# Patient Record
Sex: FEMALE | Race: WHITE | NOT HISPANIC OR LATINO | URBAN - METROPOLITAN AREA
[De-identification: names, ages, dates, MRNs, and addresses within clinical notes are randomized per-mention and may not be internally consistent; named-entity substitution may affect disease eponyms.]

---

## 2017-02-15 ENCOUNTER — EMERGENCY (EMERGENCY)
Facility: HOSPITAL | Age: 72
LOS: 1 days | Discharge: HOME | End: 2017-02-15
Attending: EMERGENCY MEDICINE | Admitting: EMERGENCY MEDICINE
Payer: MEDICARE

## 2017-02-15 VITALS
SYSTOLIC BLOOD PRESSURE: 164 MMHG | HEART RATE: 100 BPM | WEIGHT: 126.1 LBS | TEMPERATURE: 98 F | RESPIRATION RATE: 18 BRPM | DIASTOLIC BLOOD PRESSURE: 92 MMHG | OXYGEN SATURATION: 97 %

## 2017-02-15 DIAGNOSIS — Z23 ENCOUNTER FOR IMMUNIZATION: ICD-10-CM

## 2017-02-15 DIAGNOSIS — S81.852A OPEN BITE, LEFT LOWER LEG, INITIAL ENCOUNTER: ICD-10-CM

## 2017-02-15 DIAGNOSIS — S81.812A LACERATION WITHOUT FOREIGN BODY, LEFT LOWER LEG, INITIAL ENCOUNTER: ICD-10-CM

## 2017-02-15 PROCEDURE — 99283 EMERGENCY DEPT VISIT LOW MDM: CPT | Mod: 25

## 2017-02-15 PROCEDURE — 90715 TDAP VACCINE 7 YRS/> IM: CPT

## 2017-02-15 PROCEDURE — 99283 EMERGENCY DEPT VISIT LOW MDM: CPT

## 2017-02-15 PROCEDURE — 90471 IMMUNIZATION ADMIN: CPT

## 2017-02-15 RX ORDER — TETANUS TOXOID, REDUCED DIPHTHERIA TOXOID AND ACELLULAR PERTUSSIS VACCINE, ADSORBED 5; 2.5; 8; 8; 2.5 [IU]/.5ML; [IU]/.5ML; UG/.5ML; UG/.5ML; UG/.5ML
0.5 SUSPENSION INTRAMUSCULAR ONCE
Qty: 0 | Refills: 0 | Status: COMPLETED | OUTPATIENT
Start: 2017-02-15 | End: 2017-02-15

## 2017-02-15 RX ADMIN — Medication 1 TABLET(S): at 23:42

## 2017-02-15 RX ADMIN — TETANUS TOXOID, REDUCED DIPHTHERIA TOXOID AND ACELLULAR PERTUSSIS VACCINE, ADSORBED 0.5 MILLILITER(S): 5; 2.5; 8; 8; 2.5 SUSPENSION INTRAMUSCULAR at 23:42

## 2017-02-15 NOTE — ED ADULT NURSE NOTE - OBJECTIVE STATEMENT
[place] A&O3, presents to ed for c.o dog bite to L shin. pt reports being bitten by neighbors dog, neighbor at bedside. pt takes asa 81 daily. denies any other injuries. leg wrapped with gauze, active bleeding. unknown last tetanus. awaiting md jimenez. vss

## 2017-02-16 NOTE — ED PROVIDER NOTE - MEDICAL DECISION MAKING DETAILS
complex laceration secondary to dog bite, loosely approximated with suture however mostly left open given high risk for infection. will heal by secondary intention. with prophylax with abx. PMD follow up this week.

## 2017-02-16 NOTE — ED PROVIDER NOTE - OBJECTIVE STATEMENT
72 yo with dog bite to L lower extremity, animal belonged to neighbor who states that dog is vaccinated.

## 2017-02-17 ENCOUNTER — EMERGENCY (EMERGENCY)
Facility: HOSPITAL | Age: 72
LOS: 1 days | Discharge: HOME | End: 2017-02-17
Attending: EMERGENCY MEDICINE | Admitting: EMERGENCY MEDICINE
Payer: MEDICARE

## 2017-02-17 VITALS
SYSTOLIC BLOOD PRESSURE: 126 MMHG | RESPIRATION RATE: 18 BRPM | HEART RATE: 76 BPM | OXYGEN SATURATION: 100 % | DIASTOLIC BLOOD PRESSURE: 77 MMHG | TEMPERATURE: 98 F | WEIGHT: 207.01 LBS

## 2017-02-17 DIAGNOSIS — S81.812D LACERATION WITHOUT FOREIGN BODY, LEFT LOWER LEG, SUBSEQUENT ENCOUNTER: ICD-10-CM

## 2017-02-17 DIAGNOSIS — Z79.2 LONG TERM (CURRENT) USE OF ANTIBIOTICS: ICD-10-CM
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
## 2017-02-17 PROCEDURE — 99282 EMERGENCY DEPT VISIT SF MDM: CPT

## 2017-02-17 NOTE — ED PROVIDER NOTE - OBJECTIVE STATEMENT
pt to ed with left lower leg pain and laceration from dog bite pt has sutures and area left open for drainage from dog bite no red no streaks no DC mild bleeding no cough no new injury no other complaints pt on ABs and will FU in 10 days needs a would redressed

## 2017-02-17 NOTE — ED ADULT NURSE NOTE - OBJECTIVE STATEMENT
Patient returning after being seen "Wed" for dog bite by a "queta Sr".  Patient denies pain but states after removing dressing placed by provider began to rebleed.  Bleeding now absent, wound seen to be re-opened from "temporary stitches."  Provider to evaluate.

## 2017-02-17 NOTE — ED ADULT TRIAGE NOTE - CHIEF COMPLAINT QUOTE
c/o bleeding to left lower leg from new skin tear. seen and treated for dog bite to left lower leg; 2 stitches placed on wednesday.  Not on thinners. Bleeding controlled. Denies fever, chills, dizziness, n/v/d.

## 2017-09-15 ENCOUNTER — APPOINTMENT (OUTPATIENT)
Dept: OPHTHALMOLOGY | Facility: CLINIC | Age: 72
End: 2017-09-15
Payer: MEDICARE

## 2017-09-15 DIAGNOSIS — H25.013 CORTICAL AGE-RELATED CATARACT, BILATERAL: ICD-10-CM

## 2017-09-15 PROBLEM — Z00.00 ENCOUNTER FOR PREVENTIVE HEALTH EXAMINATION: Status: ACTIVE | Noted: 2017-09-15

## 2017-09-15 PROCEDURE — 92014 COMPRE OPH EXAM EST PT 1/>: CPT

## 2017-09-15 PROCEDURE — 92015 DETERMINE REFRACTIVE STATE: CPT

## 2017-10-03 PROBLEM — H25.013 CORTICAL SENILE CATARACT OF BOTH EYES: Status: ACTIVE | Noted: 2017-10-03

## 2018-10-26 NOTE — ED ADULT NURSE NOTE - CAS TRG GEN SKIN CONDITION
Given two months ago from a phone call for acute injury moving furniture.  Is she still needing it?  May need evaluation   Warm

## 2021-12-16 ENCOUNTER — EMERGENCY (EMERGENCY)
Facility: HOSPITAL | Age: 76
LOS: 1 days | Discharge: HOME | End: 2021-12-16
Attending: EMERGENCY MEDICINE | Admitting: EMERGENCY MEDICINE
Payer: MEDICARE

## 2021-12-16 VITALS
HEART RATE: 108 BPM | HEIGHT: 70 IN | TEMPERATURE: 98 F | SYSTOLIC BLOOD PRESSURE: 167 MMHG | WEIGHT: 132.06 LBS | DIASTOLIC BLOOD PRESSURE: 93 MMHG | RESPIRATION RATE: 18 BRPM | OXYGEN SATURATION: 98 %

## 2021-12-16 DIAGNOSIS — S01.81XA LACERATION WITHOUT FOREIGN BODY OF OTHER PART OF HEAD, INITIAL ENCOUNTER: ICD-10-CM
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
## 2021-12-16 DIAGNOSIS — S60.222A CONTUSION OF LEFT HAND, INITIAL ENCOUNTER: ICD-10-CM
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
## 2021-12-16 DIAGNOSIS — Z20.822 CONTACT WITH AND (SUSPECTED) EXPOSURE TO COVID-19: ICD-10-CM
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
## 2021-12-16 LAB
ALBUMIN SERPL ELPH-MCNC: 4.7 G/DL — SIGNIFICANT CHANGE UP (ref 3.3–5)
ALP SERPL-CCNC: 72 U/L — SIGNIFICANT CHANGE UP (ref 40–120)
ALT FLD-CCNC: 26 U/L — SIGNIFICANT CHANGE UP (ref 10–45)
ANION GAP SERPL CALC-SCNC: 10 MMOL/L — SIGNIFICANT CHANGE UP (ref 5–17)
APTT BLD: 26.2 SEC — LOW (ref 27.5–35.5)
AST SERPL-CCNC: 30 U/L — SIGNIFICANT CHANGE UP (ref 10–40)
BASOPHILS # BLD AUTO: 0.03 K/UL — SIGNIFICANT CHANGE UP (ref 0–0.2)
BASOPHILS NFR BLD AUTO: 0.4 % — SIGNIFICANT CHANGE UP (ref 0–2)
BILIRUB SERPL-MCNC: 0.4 MG/DL — SIGNIFICANT CHANGE UP (ref 0.2–1.2)
BUN SERPL-MCNC: 19 MG/DL — SIGNIFICANT CHANGE UP (ref 7–23)
CALCIUM SERPL-MCNC: 9.1 MG/DL — SIGNIFICANT CHANGE UP (ref 8.4–10.5)
CHLORIDE SERPL-SCNC: 104 MMOL/L — SIGNIFICANT CHANGE UP (ref 96–108)
CO2 SERPL-SCNC: 27 MMOL/L — SIGNIFICANT CHANGE UP (ref 22–31)
CREAT SERPL-MCNC: 0.87 MG/DL — SIGNIFICANT CHANGE UP (ref 0.5–1.3)
EOSINOPHIL # BLD AUTO: 0.01 K/UL — SIGNIFICANT CHANGE UP (ref 0–0.5)
EOSINOPHIL NFR BLD AUTO: 0.1 % — SIGNIFICANT CHANGE UP (ref 0–6)
GLUCOSE SERPL-MCNC: 103 MG/DL — HIGH (ref 70–99)
HCT VFR BLD CALC: 35.5 % — SIGNIFICANT CHANGE UP (ref 34.5–45)
HGB BLD-MCNC: 11.8 G/DL — SIGNIFICANT CHANGE UP (ref 11.5–15.5)
IMM GRANULOCYTES NFR BLD AUTO: 0.2 % — SIGNIFICANT CHANGE UP (ref 0–1.5)
INR BLD: 1.05 — SIGNIFICANT CHANGE UP (ref 0.88–1.16)
LYMPHOCYTES # BLD AUTO: 0.92 K/UL — LOW (ref 1–3.3)
LYMPHOCYTES # BLD AUTO: 11.4 % — LOW (ref 13–44)
MCHC RBC-ENTMCNC: 33.2 GM/DL — SIGNIFICANT CHANGE UP (ref 32–36)
MCHC RBC-ENTMCNC: 34.1 PG — HIGH (ref 27–34)
MCV RBC AUTO: 102.6 FL — HIGH (ref 80–100)
MONOCYTES # BLD AUTO: 0.76 K/UL — SIGNIFICANT CHANGE UP (ref 0–0.9)
MONOCYTES NFR BLD AUTO: 9.4 % — SIGNIFICANT CHANGE UP (ref 2–14)
NEUTROPHILS # BLD AUTO: 6.34 K/UL — SIGNIFICANT CHANGE UP (ref 1.8–7.4)
NEUTROPHILS NFR BLD AUTO: 78.5 % — HIGH (ref 43–77)
NRBC # BLD: 0 /100 WBCS — SIGNIFICANT CHANGE UP (ref 0–0)
PLATELET # BLD AUTO: 238 K/UL — SIGNIFICANT CHANGE UP (ref 150–400)
POTASSIUM SERPL-MCNC: 4 MMOL/L — SIGNIFICANT CHANGE UP (ref 3.5–5.3)
POTASSIUM SERPL-SCNC: 4 MMOL/L — SIGNIFICANT CHANGE UP (ref 3.5–5.3)
PROT SERPL-MCNC: 7.2 G/DL — SIGNIFICANT CHANGE UP (ref 6–8.3)
PROTHROM AB SERPL-ACNC: 12.6 SEC — SIGNIFICANT CHANGE UP (ref 10.6–13.6)
RBC # BLD: 3.46 M/UL — LOW (ref 3.8–5.2)
RBC # FLD: 13.3 % — SIGNIFICANT CHANGE UP (ref 10.3–14.5)
SARS-COV-2 RNA SPEC QL NAA+PROBE: NEGATIVE — SIGNIFICANT CHANGE UP
SODIUM SERPL-SCNC: 141 MMOL/L — SIGNIFICANT CHANGE UP (ref 135–145)
WBC # BLD: 8.08 K/UL — SIGNIFICANT CHANGE UP (ref 3.8–10.5)
WBC # FLD AUTO: 8.08 K/UL — SIGNIFICANT CHANGE UP (ref 3.8–10.5)

## 2021-12-16 PROCEDURE — 70480 CT ORBIT/EAR/FOSSA W/O DYE: CPT | Mod: MG

## 2021-12-16 PROCEDURE — 12011 RPR F/E/E/N/L/M 2.5 CM/<: CPT

## 2021-12-16 PROCEDURE — 70450 CT HEAD/BRAIN W/O DYE: CPT | Mod: 26,MG

## 2021-12-16 PROCEDURE — 73130 X-RAY EXAM OF HAND: CPT | Mod: 26,LT

## 2021-12-16 PROCEDURE — 99284 EMERGENCY DEPT VISIT MOD MDM: CPT | Mod: 25

## 2021-12-16 PROCEDURE — 87635 SARS-COV-2 COVID-19 AMP PRB: CPT

## 2021-12-16 PROCEDURE — 70450 CT HEAD/BRAIN W/O DYE: CPT | Mod: MG

## 2021-12-16 PROCEDURE — G1004: CPT

## 2021-12-16 PROCEDURE — 85025 COMPLETE CBC W/AUTO DIFF WBC: CPT

## 2021-12-16 PROCEDURE — 73130 X-RAY EXAM OF HAND: CPT

## 2021-12-16 PROCEDURE — 85730 THROMBOPLASTIN TIME PARTIAL: CPT

## 2021-12-16 PROCEDURE — 36415 COLL VENOUS BLD VENIPUNCTURE: CPT

## 2021-12-16 PROCEDURE — 70480 CT ORBIT/EAR/FOSSA W/O DYE: CPT | Mod: 26,59,MG

## 2021-12-16 PROCEDURE — 90715 TDAP VACCINE 7 YRS/> IM: CPT

## 2021-12-16 PROCEDURE — 85610 PROTHROMBIN TIME: CPT

## 2021-12-16 PROCEDURE — 80053 COMPREHEN METABOLIC PANEL: CPT

## 2021-12-16 RX ORDER — TETANUS TOXOID, REDUCED DIPHTHERIA TOXOID AND ACELLULAR PERTUSSIS VACCINE, ADSORBED 5; 2.5; 8; 8; 2.5 [IU]/.5ML; [IU]/.5ML; UG/.5ML; UG/.5ML; UG/.5ML
0.5 SUSPENSION INTRAMUSCULAR ONCE
Refills: 0 | Status: COMPLETED | OUTPATIENT
Start: 2021-12-16 | End: 2021-12-16

## 2021-12-16 RX ORDER — ACETAMINOPHEN 500 MG
650 TABLET ORAL ONCE
Refills: 0 | Status: COMPLETED | OUTPATIENT
Start: 2021-12-16 | End: 2021-12-16

## 2021-12-16 RX ADMIN — Medication 650 MILLIGRAM(S): at 19:19

## 2021-12-16 RX ADMIN — TETANUS TOXOID, REDUCED DIPHTHERIA TOXOID AND ACELLULAR PERTUSSIS VACCINE, ADSORBED 0.5 MILLILITER(S): 5; 2.5; 8; 8; 2.5 SUSPENSION INTRAMUSCULAR at 14:17

## 2021-12-16 NOTE — ED PROVIDER NOTE - PATIENT PORTAL LINK FT
You can access the FollowMyHealth Patient Portal offered by [facility] by registering at the following website: [email or web address]. By joining [facility]’s FollowMyHealth portal, you will also be able to view your health information using other applications (apps) compatible with our system.

## 2021-12-16 NOTE — ED ADULT NURSE NOTE - OBJECTIVE STATEMENT
pt with hx of GBM. per triage notes, pt was found on the street by bystanders and sent here.     pt stated that she tripped and fell, denies loc. pt states that she was able to come back home and come here to ED.    pt AAOx3 on interview. pt with EEG monitoring. pt denies any pain. noted with left forehead lac. pt with left hand swelling,. left orbital bruising.

## 2021-12-16 NOTE — ED PROVIDER NOTE - ATTENDING CONTRIBUTION TO CARE
Attending Statement: I have personally performed a face to face diagnostic evaluation on this patient. I have reviewed the ACP note and agree with the history, exam and plan of care, except as noted.     Attending Contribution to Care:  76F with above PMHx who present s/p mechanical fall PTA -- recalls trip and fall while walking her dog, also reports episode of generalized arm shaking (b/l) but was fully awake and aware of episode w/o incontinence, loc or tongue, pt currently with portable EEG for seizure-like episodes that she has been having lately. Lac sutured, td updated, XRay hand shows +fracture and was splinted, Ct head done but inconclusive 2/2 to electrodes (pt not willing to have them taken off and findings discussed w/fellow at [facility] who will ensure pt f/u w/dr olson tomorrow), pt w/nonfocal neuro exam, does not want admission and has capacity to make decisions at this time. Strict return precautions given, pt understands and agrees w/plan.

## 2021-12-16 NOTE — ED ADULT TRIAGE NOTE - CHIEF COMPLAINT QUOTE
pt found on street by bystanders- pt states " I think I may have had a seizure". pt has continuous EEG monitoring on- hx GBM. pt sustained lac to Lt eyebrow and Lt hand pain/swelling. pt A&O x 3. denies anticoagulant use.

## 2021-12-16 NOTE — ED PROCEDURE NOTE - CPROC ED TIME OUT STATEMENT1
“Patient's name, , procedure and correct site were confirmed during the [place] Timeout.”
“Patient's name, , procedure and correct site were confirmed during the [place] Timeout.”

## 2021-12-16 NOTE — ED PROVIDER NOTE - CLINICAL SUMMARY MEDICAL DECISION MAKING FREE TEXT BOX
pt s/p mechanical fall PTA -- recalls trip and fall while walking her dog, states that went home and tried to manage the cut but bleeding con't, states that had an episode of generalized arm shaking (b/l) but was fully awake and aware of episode w/o incontinence, loc or tongue lac -- doubt sz, pt w/portable eeg in place by her neuro (dr olson at [facility]) and does not want electrodes removed, states "this is very important", lac sutured, td updated, ct head done but inconclusive 2/2 to electrodes (pt not willing to have them taken off and findings discussed w/fellow at [facility] who will ensure pt f/u w/dr olson [facility]), pt w/nonfocal neuro exam, does not want admission and does appear to be a safe dc, strict return precautions given, pt understands and agrees w/plan

## 2021-12-16 NOTE — ED PROVIDER NOTE - ENMT, MLM
Airway patent, Nasal mucosa clear. Mouth with normal mucosa. no tongue lac. Throat has no vesicles, no oropharyngeal exudates and uvula is midline. Ears: no hemotympanum b/l. + 2 cm linear lac to L superior orbit, + L orbital ecchymosis, no step offs, no camara signs b/l, no raccoon eyes b/l

## 2021-12-16 NOTE — ED PROVIDER NOTE - NSFOLLOWUPINSTRUCTIONS_ED_ALL_ED_FT
PLEASE FOLLOW UP WITH DR THAO TOMORROW FOR EVALUATION AND FURTHER MANAGEMENT--YOUR CT TODAY WAS INCONCLUSIVE AND SHOULD BE REPEATED FOR DEFINITIVE DIAGNOSIS  YOUR SUTURES NEED TO BE REMOVED IN 7 DAYS  KEEP CLEAN AND DRY, WASH GENTLY WITH WATER AND SOAP, APPLY BACITRACIN DAILY  REST, ICE AND ELEVATE THE HAND, FINGER SPLINT AND ACE WRAP FOR COMFORT  Closed Head Injury  A closed head injury is an injury to your head that may or may not involve a traumatic brain injury (TBI). Symptoms of TBI can be short or long lasting and include headache, dizziness, interference with memory or speech, fatigue, confusion, changes in sleep, mood changes, nausea, depression/anxiety, and dulling of senses. Make sure to obtain proper rest which includes getting plenty of sleep, avoiding excessive visual stimulation, and avoiding activities that may cause physical or mental stress. Avoid any situation where there is potential for another head injury, including sports.  SEEK IMMEDIATE MEDICAL CARE IF YOU HAVE ANY OF THE FOLLOWING SYMPTOMS: unusual drowsiness, vomiting, severe dizziness, seizures, lightheadedness, muscular weakness, different pupil sizes, visual changes, or clear or bloody discharge from your ears or nose.  P.R.I.C.E. Treatment  WHAT YOU NEED TO KNOW:  P.R.I.C.E. treatment is a 5-step process used to decrease swelling and pain caused by an injury. P.R.I.C.E. stands for protect, rest, ice, compress, and elevate. Start P.R.I.C.E. within 24 to 48 hours of an injury.  DISCHARGE INSTRUCTIONS:  Return to the emergency department if:   •Your pain is severe  •You have severe swelling or deformity.  •You have numbness in the injured area.  Call your doctor if:   •Your pain and swelling do not go away after a few days.  •You have questions or concerns about your condition or care.  How to use P.R.I.C.E. treatment:   R.I.C.E.  •Protect your injury from more damage. Support the injured area with a brace or splint. Your healthcare provider will tell you how long to use the brace or splint.  •Rest your injured area as directed. You may need to stop using, or keep weight off, the injury for 48 hours or longer. Your healthcare provider may recommend crutches or another device. Return to your usual activities as directed.  •Apply ice on your injured area for 15 to 20 minutes every 4 hours or as directed. Use an ice pack, or put crushed ice in a plastic bag. Cover the bag with a towel before you apply it to your skin. Ice helps prevent tissue damage and decreases swelling and pain.  •Compress (keep pressure on) the injured area. Compression will help decrease swelling and support the injured area. Use an elastic bandage, air stirrup, splint, or sling as directed. If you use an elastic bandage, make sure the bandage is not too tight. You should be able to slip 2 fingers between the bandage and your skin  •Elevate the injured area above the level of your heart as often as you can. This will help decrease swelling and pain. Prop the injured area on pillows or blankets to keep it elevated comfortably.

## 2021-12-16 NOTE — ED PROVIDER NOTE - OBJECTIVE STATEMENT
The pt is a 75 y/o F, who presents to ED c/o cut to face and bruising to L hand s/p trip and fall this am -- pt was walking her dog, tripped over sidewalk and fell, recalls hitting her head and putting her hand out to break the fall, was able to get up and went home, states "I tried to clean up the cut but it keeps bleeding". While at home, states that she started to shake all over and episode lasted a few min, was awake and alert the entire time and aware of what was happening, symptoms self resolved. Hx of glioblastoma, multiple brain surg in past, currently w/portable EEG in place (neuro non lh). Last Td >5yrs ago. Pt c/o pain to L hand, + bruising, limited ROM. Denies h/a, visual changes, hearing changes, neck or back pain, cp, sob, dizziness prior to fall, inability to walk, leg pain, bowel or bladder incontinence.

## 2021-12-16 NOTE — ED PROVIDER NOTE - MUSCULOSKELETAL, MLM
Spine appears normal, range of motion is not limited, no muscle or joint tenderness; L hand: + mod ecchymosis and swelling over dorsal aspect of 3rd-5th digits and metacarpals, limited ROM of 3rd phalanx at PIP, + light touch, cap refill <2sec, no wrist tend, radial pulse 2+, no elbow or shoulder tend

## 2021-12-16 NOTE — ED PROVIDER NOTE - PCP FREE TEXT FOR MDM DISCUSSED CASE WITH QUESTION
dr olson fellow -- pt and ct discussed, will f/u in office [facility] for removal of eeg and imaging and then reapplication of eeg

## 2024-04-18 NOTE — ED PROCEDURE NOTE - CPROC ED POST PROC CARE GUIDE1
Jeremy Sanchez ([place]: [id number]) - [id number]  Potassium Citrate-Citric Acid 1100-334MG/5ML solution  Status: PA RequestCreated: April 18th, 2024 [phone]: April 18th, 2024  
Verbal/written post procedure instructions were given to patient/caregiver./Instructed patient/caregiver to follow-up with primary care physician./Instructed patient/caregiver regarding signs and symptoms of infection./Keep the cast/splint/dressing clean and dry.
Verbal/written post procedure instructions were given to patient/caregiver./Instructed patient/caregiver to follow-up with primary care physician./Instructed patient/caregiver regarding signs and symptoms of infection./Elevate the injured extremity as instructed./Keep the cast/splint/dressing clean and dry.
